# Patient Record
Sex: FEMALE | Race: OTHER
[De-identification: names, ages, dates, MRNs, and addresses within clinical notes are randomized per-mention and may not be internally consistent; named-entity substitution may affect disease eponyms.]

---

## 2018-07-17 ENCOUNTER — HOSPITAL ENCOUNTER (OUTPATIENT)
Dept: HOSPITAL 62 - WI | Age: 53
End: 2018-07-17
Attending: INTERNAL MEDICINE
Payer: COMMERCIAL

## 2018-07-17 DIAGNOSIS — Z12.31: Primary | ICD-10-CM

## 2018-07-17 PROCEDURE — 77063 BREAST TOMOSYNTHESIS BI: CPT

## 2018-07-17 PROCEDURE — 77067 SCR MAMMO BI INCL CAD: CPT

## 2018-07-18 NOTE — WOMENS IMAGING REPORT
EXAM DESCRIPTION:  3D SCREENING MAMMO BILAT



COMPLETED DATE/TIME:  7/17/2018 11:03 am



REASON FOR STUDY:  SCREENING MAMMO Z12.31  ENCNTR SCREEN MAMMOGRAM FOR MALIGNANT NEOPLASM OF ANY



COMPARISON:  2/6/2014



TECHNIQUE:  Standard craniocaudal and mediolateral oblique views of each breast recorded using digita
l acquisition and breast tomosynthesis.



LIMITATIONS:  None.



FINDINGS:  No masses, calcifications or architectural distortion. No areas of suspicion.

Read with the assistance of CAD.

.[facility] - R2 Cenova Version 1.3

.[facility] - R2 Cenova Version 1.3

.[facility] - R2 Cenova Version 2.4

.[facility] [place] - R2 Cenova Version 2.4

.[facility] - R2  Version 9.2



IMPRESSION:  NORMAL MAMMOGRAM.  BIRADS 1.



BREAST DENSITY:  a. The breasts are almost entirely fatty.



BIRAD:  1 NEGATIVE



RECOMMENDATION:  ROUTINE SCREENING

Please continue yearly bilateral screening mammography/tomosynthesis in July 2019.



COMMENT:  The patient has been notified of the results by letter per SA requirements. Additional no
tification policies are in place for contacting patient with suspicious or incomplete findings.

Quality ID #[id number]: The American College of Radiology recommends an annual screening mammogram for women
 aged 40 years or over. This facility utilizes a reminder system to ensure that all patients receive 
reminder letters, and/or direct phone calls for appointments. This includes reminders for routine scr
eening mammograms, diagnostic mammograms, or other Breast Imaging Interventions when appropriate.  Th
is patient will be placed in the appropriate reminder system.

The American College of Radiology (ACR) has developed recommendations for screening MRI of the breast
s in certain patient populations, to be used in conjunction with mammography.  Breast MRI surveillanc
e may be appropriate for women with more than 20% lifetime risk of developing breast cancer  as deter
mined by genetic testing, significant family history of the disease, or history of mantle radiation f
or Hodgkins Disease.  ACR Practice Guidelines 2008.

[employer]



PQRS 6045F: Fluoroscopic imaging is not utilized for breast tomosynthesis.



TECHNICAL DOCUMENTATION:  FINDING NUMBER: (1)

ASSESSMENT:  (1)

JOB ID:  [id number]

 2011 [employer]- All Rights Reserved



Reading location - IP/workstation name: [facility]-RR2

## 2018-07-20 ENCOUNTER — HOSPITAL ENCOUNTER (INPATIENT)
Dept: HOSPITAL 62 - ER | Age: 53
LOS: 4 days | Discharge: HOME | DRG: 638 | End: 2018-07-24
Attending: INTERNAL MEDICINE | Admitting: INTERNAL MEDICINE
Payer: COMMERCIAL

## 2018-07-20 DIAGNOSIS — I11.0: ICD-10-CM

## 2018-07-20 DIAGNOSIS — Z87.891: ICD-10-CM

## 2018-07-20 DIAGNOSIS — E66.01: ICD-10-CM

## 2018-07-20 DIAGNOSIS — Z88.6: ICD-10-CM

## 2018-07-20 DIAGNOSIS — E78.5: ICD-10-CM

## 2018-07-20 DIAGNOSIS — D64.9: ICD-10-CM

## 2018-07-20 DIAGNOSIS — E11.00: Primary | ICD-10-CM

## 2018-07-20 DIAGNOSIS — Z79.84: ICD-10-CM

## 2018-07-20 DIAGNOSIS — I50.9: ICD-10-CM

## 2018-07-20 DIAGNOSIS — Z90.710: ICD-10-CM

## 2018-07-20 LAB
ADD MANUAL DIFF: NO
ALBUMIN SERPL-MCNC: 4.9 G/DL (ref 3.5–5)
ALP SERPL-CCNC: 131 U/L (ref 38–126)
ALT SERPL-CCNC: 36 U/L (ref 9–52)
ANION GAP SERPL CALC-SCNC: 22 MMOL/L (ref 5–19)
APPEARANCE UR: CLEAR
APTT PPP: (no result) S
AST SERPL-CCNC: 25 U/L (ref 14–36)
BASE EXCESS BLDV CALC-SCNC: -5 MMOL/L
BASOPHILS # BLD AUTO: 0.1 10^3/UL (ref 0–0.2)
BASOPHILS NFR BLD AUTO: 1.1 % (ref 0–2)
BILIRUB DIRECT SERPL-MCNC: 0.5 MG/DL (ref 0–0.4)
BILIRUB SERPL-MCNC: 0.7 MG/DL (ref 0.2–1.3)
BILIRUB UR QL STRIP: NEGATIVE
BUN SERPL-MCNC: 22 MG/DL (ref 7–20)
CALCIUM: 9.9 MG/DL (ref 8.4–10.2)
CHLORIDE SERPL-SCNC: 94 MMOL/L (ref 98–107)
CO2 SERPL-SCNC: 22 MMOL/L (ref 22–30)
EOSINOPHIL # BLD AUTO: 0.1 10^3/UL (ref 0–0.6)
EOSINOPHIL NFR BLD AUTO: 0.6 % (ref 0–6)
ERYTHROCYTE [DISTWIDTH] IN BLOOD BY AUTOMATED COUNT: 16.6 % (ref 11.5–14)
GLUCOSE SERPL-MCNC: 647 MG/DL (ref 75–110)
GLUCOSE UR STRIP-MCNC: >=500 MG/DL
HCO3 BLDV-SCNC: 20.1 MMOL/L (ref 20–32)
HCT VFR BLD CALC: 44.7 % (ref 36–47)
HGB BLD-MCNC: 14.5 G/DL (ref 12–15.5)
KETONES UR STRIP-MCNC: 20 MG/DL
LYMPHOCYTES # BLD AUTO: 3.8 10^3/UL (ref 0.5–4.7)
LYMPHOCYTES NFR BLD AUTO: 35 % (ref 13–45)
MCH RBC QN AUTO: 26 PG (ref 27–33.4)
MCHC RBC AUTO-ENTMCNC: 32.5 G/DL (ref 32–36)
MCV RBC AUTO: 80 FL (ref 80–97)
MONOCYTES # BLD AUTO: 0.7 10^3/UL (ref 0.1–1.4)
MONOCYTES NFR BLD AUTO: 6.8 % (ref 3–13)
NEUTROPHILS # BLD AUTO: 6.2 10^3/UL (ref 1.7–8.2)
NEUTS SEG NFR BLD AUTO: 56.5 % (ref 42–78)
NITRITE UR QL STRIP: NEGATIVE
PCO2 BLDV: 37.7 MMHG (ref 35–63)
PH BLDV: 7.34 [PH] (ref 7.3–7.42)
PH UR STRIP: 5 [PH] (ref 5–9)
PLATELET # BLD: 397 10^3/UL (ref 150–450)
POTASSIUM SERPL-SCNC: 5.2 MMOL/L (ref 3.6–5)
PROT SERPL-MCNC: 8.6 G/DL (ref 6.3–8.2)
PROT UR STRIP-MCNC: NEGATIVE MG/DL
RBC # BLD AUTO: 5.58 10^6/UL (ref 3.72–5.28)
SODIUM SERPL-SCNC: 137.8 MMOL/L (ref 137–145)
SP GR UR STRIP: 1.03
TOTAL CELLS COUNTED % (AUTO): 100 %
UROBILINOGEN UR-MCNC: NEGATIVE MG/DL (ref ?–2)
WBC # BLD AUTO: 11 10^3/UL (ref 4–10.5)

## 2018-07-20 PROCEDURE — 94660 CPAP INITIATION&MGMT: CPT

## 2018-07-20 PROCEDURE — 83036 HEMOGLOBIN GLYCOSYLATED A1C: CPT

## 2018-07-20 PROCEDURE — 96375 TX/PRO/DX INJ NEW DRUG ADDON: CPT

## 2018-07-20 PROCEDURE — 85025 COMPLETE CBC W/AUTO DIFF WBC: CPT

## 2018-07-20 PROCEDURE — 80053 COMPREHEN METABOLIC PANEL: CPT

## 2018-07-20 PROCEDURE — 36415 COLL VENOUS BLD VENIPUNCTURE: CPT

## 2018-07-20 PROCEDURE — 80061 LIPID PANEL: CPT

## 2018-07-20 PROCEDURE — 82803 BLOOD GASES ANY COMBINATION: CPT

## 2018-07-20 PROCEDURE — 96374 THER/PROPH/DIAG INJ IV PUSH: CPT

## 2018-07-20 PROCEDURE — 81001 URINALYSIS AUTO W/SCOPE: CPT

## 2018-07-20 PROCEDURE — 82962 GLUCOSE BLOOD TEST: CPT

## 2018-07-20 PROCEDURE — 99285 EMERGENCY DEPT VISIT HI MDM: CPT

## 2018-07-20 PROCEDURE — G0378 HOSPITAL OBSERVATION PER HR: HCPCS

## 2018-07-20 PROCEDURE — 96361 HYDRATE IV INFUSION ADD-ON: CPT

## 2018-07-20 NOTE — ER DOCUMENT REPORT
ED General





- General


Chief Complaint: High Blood Sugar


Stated Complaint: HIGH BLOOD SUGAR


Time Seen by Provider: 07/20/18 19:13


TRAVEL OUTSIDE OF THE [place] IN LAST 30 DAYS: No





- HPI


Notes: 





53-year-old female presents from urgent care with elevated blood sugar.  She 

has had intermittent blurry vision and increased urination over the past few 

days.  Blurry vision worsened today while looking at the computer.  She went to 

urgent care and was told that her blood sugar was elevated.  It was advised to 

come to the emergency department.  Denies any nausea, vomiting, abdominal pain, 

diarrhea.  Denies family history of diabetes.  No fevers chills.  No pain with 

urination.





- Related Data


Allergies/Adverse Reactions: 


 





codeine [Codeine] Allergy (Mild, Verified 06/13/14 07:05)


 crazy











Past Medical History





- Social History


Smoking Status: Never Smoker


Chew tobacco use (# tins/day): No


Frequency of alcohol use: None


Drug Abuse: None


Family History: Reviewed & Not Pertinent


Patient has suicidal ideation: No


Patient has homicidal ideation: No





- Past Medical History


Cardiac Medical History: Reports: Hx Coronary Artery Disease, Hx Hypertension


   Denies: Hx Heart Attack


Pulmonary Medical History: 


   Denies: Hx Asthma, Hx Bronchitis, Hx COPD, Hx Pneumonia


Neurological Medical History: Denies: Hx Cerebrovascular Accident, Hx Seizures


Renal/ Medical History: Denies: Hx Peritoneal Dialysis


Musculoskeletal Medical History: Denies Hx Arthritis


Past Surgical History: Reports: Hx Hysterectomy





- Immunizations


Hx Diphtheria, Pertussis, Tetanus Vaccination: Yes





Review of Systems





- Review of Systems


Notes: 





REVIEW OF SYSTEMS:


CONSTITUTIONAL: -fevers, -chills


EENT: -eye pain, -difficulty swallowing, -nasal congestion, + blurry vision


CARDIOVASCULAR: -chest pain, -syncope.


RESPIRATORY: -cough, -SOB


GASTROINTESTINAL: -abdominal pain, -nausea, -vomiting, -diarrhea


GENITOURINARY: -dysuria, -hematuria, + increased urination


MUSCULOSKELETAL: -back pain, -neck pain


SKIN: -rash or skin lesions.


HEMATOLOGIC: -easy bruising or bleeding.


LYMPHATIC: -swollen, enlarged glands.


NEUROLOGICAL: -altered mental status or loss of consciousness, -headache, -

neurologic symptoms


PSYCHIATRIC: -anxiety, -depression.








Physical Exam





- Vital signs


Vitals: 


 











Temp Pulse Resp BP Pulse Ox


 


 97.9 F   90   20   96/67 L  100 


 


 07/20/18 18:40  07/20/18 18:40  07/20/18 18:40  07/20/18 18:40  07/20/18 18:40














- Notes


Notes: 





PHYSICAL EXAMINATION:


GENERAL: Well-appearing, well-nourished and in no acute distress.  Morbid 

obesity


HEAD: Atraumatic, normocephalic.


EYES: Pupils equal round and reactive to light, extraocular movements intact, 

conjunctiva are normal.


ENT: nares patent, oropharynx clear without exudates.  Moist mucous membranes.


NECK: Normal range of motion, supple without lymphadenopathy


LUNGS: Breath sounds clear to auscultation bilaterally and equal.  No wheezes 

rales or rhonchi.


HEART: Regular rate and rhythm, no chest wall tenderness 


ABDOMEN: Soft, nontender, normoactive bowel sounds.  No guarding, no rebound.  

No masses appreciated.


EXTREMITIES: Normal range of motion, no pitting or edema.  No cyanosis.


NEUROLOGICAL: Cranial nerves grossly intact.  Normal speech, normal gait.  

Normal sensory and motor exams.


PSYCH: Normal mood, normal affect.


SKIN: Warm, Dry, normal turgor, no rashes or lesions noted.





Course





- Re-evaluation


Re-evalutation: 





07/20/18 22:04


Blood sugar elevated without signs of diabetic ketoacidosis.  We will start 

metformin and give a dose of IV insulin and recheck blood sugar.  Asymptomatic.


07/20/18 23:55


Patient developed severe muscle cramps and spasms shortly after receiving 

metformin and insulin.  She has carpal spasm with reddening of her palms.  She 

denies similar symptoms in the past.  She was prescribed metformin at urgent 

care today, but did not take a dose. she was given 15 units of SC humulin at 

urgent care.  She is feeling slightly more comfortable after Ativan and 

Toradol.  Discussed with hospitalist for possible admission, and he recommended 

discharge with prescription for Amaryl.  Will monitor for 1 more hour and 

recheck blood sugar again prior to discharge. will discuss with DR Uriarte at 

shift change for further monitoring.








- Vital Signs


Vital signs: 


 











Temp Pulse Resp BP Pulse Ox


 


 97.9 F   90   22 H  141/102 H  100 


 


 07/20/18 18:40  07/20/18 18:40  07/20/18 20:57  07/20/18 20:57  07/20/18 20:57














- Laboratory


Result Diagrams: 


 07/20/18 19:33





 07/20/18 19:33


Laboratory results interpreted by me: 


 











  07/20/18 07/20/18 07/20/18





  19:33 19:33 19:33


 


WBC  11.0 H  


 


RBC  5.58 H  


 


MCH  26.0 L  


 


RDW  16.6 H  


 


Potassium   5.2 H 


 


Chloride   94 L 


 


Anion Gap   22 H 


 


BUN   22 H 


 


Glucose   647 H* 


 


Direct Bilirubin   0.5 H 


 


Alkaline Phosphatase   131 H 


 


Total Protein   8.6 H 


 


Urine Glucose (UA)    >=500 H


 


Urine Ketones    20 H


 


Urine Blood    SMALL H














- Transfer of Care


Care transferred to following provider: Kalani





Discharge





- Discharge


Clinical Impression: 


 Hyperglycemia





Condition: Good


Disposition: HOME, SELF-CARE


Instructions:  Diabetes ([facility])


Additional Instructions: 


Decrease carbohydrates in diet.  Monitor blood sugar daily and keep a log.  You 

must follow-up closely with a primary care physician for further management.  

Return for worsening or concerning symptoms.








HYPERGLYCEMIA (HIGH BLOOD SUGAR):





     You have an abnormally high blood sugar. Not all high blood sugar requires 

long-term treatment.  High blood sugar can be due to medications, pregnancy, or 

the stress of illness.  (These cases are "borderline diabetes.")  If the doctor 

feels your high blood sugar might resolve with time, you may not require 

treatment now.


     It's very important that you follow through, to see if the blood sugar 

returns to normal levels.  Uncontrolled high blood sugar leads to early heart 

disease, strokes, nerve damage, eye damage, and kidney damage.


     Call the physician if there is faintness, excess sleepiness, or very rapid 

breathing.








DIABETES:





     You have an abnormally high blood sugar, suspicious for diabetes. Not all 

high blood sugar requires long-term treatment.  High blood sugar can be due to 

medications, pregnancy, or the stress of illness. (These cases are "borderline 

diabetes.")  If the doctor feels your high blood sugar might get better with 

time, you may not require treatment now.


     It's very important that you follow through.  Uncontrolled high blood 

sugar leads to early heart disease, strokes, nerve damage, eye damage, and 

kidney damage.


     All diabetics should follow a diet designed to control the blood sugar.  

Overweight diabetics should exercise regularly and lose weight. If this is not 

sufficient to control the blood sugar, pills or insulin


shots are necessary.  Younger people who develop diabetes almost always require 

insulin daily.


     Home testing of blood sugars or urine sugar is required. Diabetic teaching 

is available to help you figure insulin doses and monitor the blood sugar.


     Call the physician if there is faintness, excess sleepiness, or very rapid 

breathing.  If hypoglycemia (LOW blood sugar) develops, symptoms are shakiness, 

weakness, sweating, and confusion.  In this case, you should eat or drink 

something with sugar at once.








INSULIN:


     Insulin is a natural hormone that lowers blood sugar.  Normal blood sugar 

prevents complications of diabetes.  For most diabetics, insulin is the best 

way to treat the illness.


     Be sure you know how to measure the insulin correctly.  Insulin is 

measured in "units."  There are three types of insulin: N (NPH or long acting), 

R (regular or short acting), and L (Lente or very long acting).  Be sure you 

are using the right amount of each type.


     Insulin must be injected into the fat.  You can use the abdomen, upper arms

, and thighs.  Select a different injection site every time. Wipe the site with 

alcohol before injecting.


     When first starting insulin, some adjusting of the insulin dose is 

necessary.  Keep a record of each insulin dose and time of injection, and of 

the blood sugar and the time you test it.


     Sometimes insulin can make the blood sugar too low.  If you become dizzy, 

sweaty, shaky, or confused, you may be having a hypoglycemic episode.  

Immediately use juice or some other sweet food.  Call the doctor if the 

symptoms don't go away.








ORAL HYPOGLYCEMIC MEDICATION:


     Oral hypoglycemics are medicines that lower blood sugar in diabetics.  

They are not effective for younger diabetics who require insulin.  Some brands 

are tolbutamide, Orinase, glipizide, Glucotrol, glyburide, DiaBeta, Glynase, 

and Micronase.


     Some medications can increase or decrease the effect of Diabinese. 

Examples are Clofibrate (Atromid-S), phenylbutazone (Butazolidin), aspirin, 

sulfonamides, Coumadin, allopurinol (Zyloprim), probenecid (Benemid), 

acetazolamide (Diamox), beta blockers, steroids, estrogens, Indocin, INH, 

Levothyroxine, nicotinic acid, Diflucan, Dilantin, and thiazide diuretics.  Be 

sure your doctor knows all the medicines you take, and talk to your doctor 

before making any changes in your medicines.


     If you develop symptoms of shakiness, sweats, and lightheadedness, your 

blood sugar may have gone too low.  Eat or drink a small amount of sweet food.  

If symptoms don't go away, call your doctor.








FOLLOW-UP CARE:


If you have been referred to a physician for follow-up care, call the physician

s office for an appointment as you were instructed or within the next two days.

  If you experience worsening or a significant change in your symptoms, notify 

the physician immediately or return to the Emergency Department at any time for 

re-evaluation.











Referrals: 


TYRONE HAHN MD [Primary Care Provider] - Follow up in 3-5 days

## 2018-07-20 NOTE — ER DOCUMENT REPORT
ED Medical Screen (RME)





- General


Chief Complaint: High Blood Sugar


Stated Complaint: HIGH BLOOD SUGAR


Time Seen by Provider: 07/20/18 19:13


TRAVEL OUTSIDE OF THE [place] IN LAST 30 DAYS: No





- HPI


Patient complains to provider of: Hyperglycemia


Notes: 





07/20/18 19:17


Patient is a 53-year-old female presenting to the emergency room complaining of 

increased thirst and increased urination over the past few days, took a blood 

sugar at home and it read high, reading high in triage area as well, no history 

of diabetes PRIOR to day





- Related Data


Allergies/Adverse Reactions: 


 





codeine [Codeine] Allergy (Mild, Verified 06/13/14 07:05)


 crazy











Past Medical History





- Past Medical History


Cardiac Medical History: Reports: Hx Coronary Artery Disease, Hx Hypertension


   Denies: Hx Heart Attack


Pulmonary Medical History: 


   Denies: Hx Asthma, Hx Bronchitis, Hx COPD, Hx Pneumonia


Neurological Medical History: Denies: Hx Cerebrovascular Accident, Hx Seizures


Musculoskeltal Medical History: Denies Hx Arthritis


Past Surgical History: Reports: Hx Hysterectomy





- Immunizations


Hx Diphtheria, Pertussis, Tetanus Vaccination: Yes





Physical Exam





- Vital signs


Vitals: 





 











Temp Pulse Resp BP Pulse Ox


 


 97.9 F   90   20   96/67 L  100 


 


 07/20/18 18:40  07/20/18 18:40  07/20/18 18:40  07/20/18 18:40  07/20/18 18:40














Course





- Vital Signs


Vital signs: 





 











Temp Pulse Resp BP Pulse Ox


 


 97.9 F   90   20   96/67 L  100 


 


 07/20/18 18:40  07/20/18 18:40  07/20/18 18:40  07/20/18 18:40  07/20/18 18:40














Doctor's Discharge





- Discharge


Referrals: 


TYRONE HAHN MD [Primary Care Provider] - Follow up as needed

## 2018-07-21 LAB
ALBUMIN SERPL-MCNC: 4 G/DL (ref 3.5–5)
ALP SERPL-CCNC: 102 U/L (ref 38–126)
ALT SERPL-CCNC: 35 U/L (ref 9–52)
ANION GAP SERPL CALC-SCNC: 24 MMOL/L (ref 5–19)
AST SERPL-CCNC: 17 U/L (ref 14–36)
BILIRUB DIRECT SERPL-MCNC: 0.4 MG/DL (ref 0–0.4)
BILIRUB SERPL-MCNC: 0.6 MG/DL (ref 0.2–1.3)
BUN SERPL-MCNC: 22 MG/DL (ref 7–20)
CALCIUM: 9.3 MG/DL (ref 8.4–10.2)
CHLORIDE SERPL-SCNC: 98 MMOL/L (ref 98–107)
CHOLEST SERPL-MCNC: 223.19 MG/DL (ref 0–200)
CO2 SERPL-SCNC: 17 MMOL/L (ref 22–30)
GLUCOSE SERPL-MCNC: 498 MG/DL (ref 75–110)
LDLC SERPL DIRECT ASSAY-MCNC: 170 MG/DL (ref ?–100)
POTASSIUM SERPL-SCNC: 4.9 MMOL/L (ref 3.6–5)
PROT SERPL-MCNC: 7.1 G/DL (ref 6.3–8.2)
SODIUM SERPL-SCNC: 139.1 MMOL/L (ref 137–145)
TRIGL SERPL-MCNC: 153 MG/DL (ref ?–150)
VLDLC SERPL CALC-MCNC: 30.6 MG/DL (ref 10–31)

## 2018-07-21 RX ADMIN — AMLODIPINE BESYLATE SCH MG: 10 TABLET ORAL at 09:28

## 2018-07-21 RX ADMIN — INSULIN LISPRO PRN UNIT: 100 INJECTION, SOLUTION INTRAVENOUS; SUBCUTANEOUS at 22:26

## 2018-07-21 RX ADMIN — HYDROCHLOROTHIAZIDE SCH MG: 25 TABLET ORAL at 09:28

## 2018-07-21 RX ADMIN — GLIPIZIDE SCH MG: 5 TABLET ORAL at 17:08

## 2018-07-21 RX ADMIN — ATORVASTATIN CALCIUM SCH MG: 20 TABLET, FILM COATED ORAL at 22:26

## 2018-07-21 RX ADMIN — INSULIN LISPRO PRN UNIT: 100 INJECTION, SOLUTION INTRAVENOUS; SUBCUTANEOUS at 17:03

## 2018-07-21 RX ADMIN — INSULIN LISPRO PRN UNIT: 100 INJECTION, SOLUTION INTRAVENOUS; SUBCUTANEOUS at 08:32

## 2018-07-21 RX ADMIN — VALSARTAN SCH MG: 160 TABLET ORAL at 09:28

## 2018-07-21 RX ADMIN — INSULIN LISPRO PRN UNIT: 100 INJECTION, SOLUTION INTRAVENOUS; SUBCUTANEOUS at 13:42

## 2018-07-21 RX ADMIN — INSULIN LISPRO PRN UNIT: 100 INJECTION, SOLUTION INTRAVENOUS; SUBCUTANEOUS at 11:23

## 2018-07-21 RX ADMIN — METFORMIN HYDROCHLORIDE SCH MG: 500 TABLET, FILM COATED ORAL at 17:02

## 2018-07-21 RX ADMIN — SODIUM CHLORIDE PRN ML: 9 INJECTION, SOLUTION INTRAVENOUS at 14:32

## 2018-07-21 NOTE — PDOC H&P
History of Present Illness


Admission Date/PCP: 


  07/21/18 00:26





  TYRONE HAHN MD





Patient complains of: Elevated  blood sugar


History of Present Illness: 


CONNIE MURILLO is a 53 year old female patient of Dr Hahn who presented to 

the ED with complain of elevated blood glucose. She reported history of 

diagnosis of borderline diabetes mellitus couple of years ago. Patient reported 

recent intermittent blurry vision, increase thirst, polydipsia, polyuria, dry 

mouth, and tingling in her fingers. She has history of hypertension. She denied 

history of hyperlipidemia. No family history of diabetes mellitus. Patient 

admitted to less physical activity due to longstanding lower back pain. She 

reported recent episode of oral thrush. She denied any fever or chills. No 

chest pain or difficulty with breathing. Her evaluation in the ED was 

significant for hyperglycemia. in the course of her treatment she was given 

Insulin bolus and oral metformin which cause severe abdominal pain. She was 

given Metformin on empty stomach. Her last meal prior to administration was 1 

pm.





Past Medical History


Cardiac Medical History: Reports: Coronary Artery Disease, Hypertension


   Denies: Congestive Heart Failure, Myocardial Infarction


Pulmonary Medical History: 


   Denies: Asthma, Bronchitis, Chronic Obstructive Pulmonary Disease (COPD), 

Pneumonia


Neurological Medical History: 


   Denies: Seizures


Musculoskeltal Medical History: 


   Denies: Arthritis


Hematology: Reports: Anemia





Past Surgical History


Past Surgical History: Reports: Hysterectomy





Social History


Smoking Status: Former Smoker


Drugs: None





- Advance Directive


Resuscitation Status: Full Code





Family History


Family History: Reviewed & Not Pertinent


Parental Family History Reviewed: Yes


Children Family History Reviewed: Yes


Sibling(s) Family History Reviewed.: Yes





Medication/Allergy


Home Medications: 








Amlodipine/Valsartan/Hcthiazid [Amlod-Valsa-Hctz -25 mg] 1 tab PO DAILY 07 /21/18 


Clindamycin HCl [Clindamycin HCl] 300 mg PO Q8 MDD FILLED 7/13 FOR 10DS 07/21/ 18 


Glipizide [Glocotrol 5 Mg Tablet] 5 mg PO BID 07/21/18 


Metformin HCl [Glucophage 500 mg Tablet] 1,000 mg PO BIDACBS 07/21/18 


Nystatin 4 tsp PO QID 07/21/18 








Allergies/Adverse Reactions: 


 





codeine [Codeine] Allergy (Mild, Verified 06/13/14 07:05)


 crazy











Review of Systems


Constitutional: ABSENT: chills, fever(s), headache(s), weight gain, weight loss


Eyes: PRESENT: visual disturbances - improving as per her assessment


Ears: ABSENT: hearing changes


Nose, Mouth, and Throat: ABSENT: as per HPI, headache(s), mouth pain, sore 

throat, vertigo, other


Cardiovascular: ABSENT: chest pain, dyspnea on exertion, edema, orthropnea, 

palpitations


Respiratory: ABSENT: cough, hemoptysis


Gastrointestinal: PRESENT: abdominal pain - with Metformin administration on 

empty stomach.  ABSENT: constipation, diarrhea, hematemesis, hematochezia, 

nausea, vomiting


Musculoskeletal: PRESENT: back pain - chronic


Integumentary: ABSENT: rash, wounds


Neurological: ABSENT: abnormal gait, abnormal speech, confusion, dizziness, 

focal weakness, syncope


Psychiatric: ABSENT: anxiety, depression, homidical ideation, suicidal ideation


Endocrine: PRESENT: polydipsia, polyuria.  ABSENT: as per HPI, cold intolerance

, flushing, heat intolerance, menstrual abnormalities, polyphagia, other


Hematologic/Lymphatic: ABSENT: easy bleeding, easy bruising, lymphadenopathy


Allergic/Immunologic: ABSENT: seasonal rhinorrhea





Physical Exam


Vital Signs: 


 











Temp Pulse Resp BP Pulse Ox


 


 98.6 F   91   18   145/77 H  100 


 


 07/21/18 08:35  07/21/18 08:35  07/21/18 08:35  07/21/18 08:35  07/21/18 08:35








 Intake & Output











 07/20/18 07/21/18 07/22/18





 06:59 06:59 06:59


 


Intake Total   465


 


Balance   465


 


Weight  168.8 kg 











General appearance: PRESENT: morbidly obese


Head exam: PRESENT: atraumatic, normocephalic


Eye exam: PRESENT: conjunctiva pink, EOMI, PERRLA.  ABSENT: scleral icterus


Ear exam: PRESENT: normal external ear exam


Mouth exam: PRESENT: moist, tongue midline


Teeth exam: PRESENT: poor dentation.  ABSENT: dental caries, dental tenderness, 

edentulous, other


Throat exam: ABSENT: post pharyngeal erythema, tonsillar erythema, tonsillar 

exudate, tonsillogmegaly, other


Neck exam: PRESENT: full ROM.  ABSENT: carotid bruit, JVD, lymphadenopathy, 

thyromegaly


Respiratory exam: PRESENT: clear to auscultation barbara


Cardiovascular exam: PRESENT: RRR.  ABSENT: diastolic murmur, rubs, systolic 

murmur


Pulses: PRESENT: normal dorsalis pedis pul, +2 pedal pulses bilateral


Vascular exam: PRESENT: normal capillary refill.  ABSENT: pallor


GI/Abdominal exam: PRESENT: normal bowel sounds, soft.  ABSENT: distended, 

guarding, mass, organolmegaly, rebound, tenderness


Rectal exam: PRESENT: deferred


Extremities exam: ABSENT: pedal edema


Musculoskeletal exam: PRESENT: normal inspection


Neurological exam: PRESENT: alert, awake, oriented to person, oriented to place

, oriented to time, oriented to situation, CN II-XII grossly intact.  ABSENT: 

motor sensory deficit


Psychiatric exam: PRESENT: appropriate affect, normal mood.  ABSENT: homicidal 

ideation, suicidal ideation


Skin exam: PRESENT: dry, intact, warm.  ABSENT: cyanosis, rash





Results


Laboratory Results: 


 





 07/21/18 09:44 





 











  07/21/18 07/21/18





  06:46 09:44


 


Sodium  Cancelled  139.1


 


Potassium  Cancelled  4.9


 


Chloride  Cancelled  98


 


Carbon Dioxide  Cancelled  17 L


 


Anion Gap  Cancelled  24 H


 


BUN  Cancelled  22 H


 


Creatinine  Cancelled  0.89


 


Est GFR ( Amer)  Cancelled  > 60


 


Est GFR (Non-Af Amer)  Cancelled  > 60


 


Glucose  Cancelled  498 H*


 


Calcium  Cancelled  9.3


 


Total Bilirubin  Cancelled  0.6


 


AST  Cancelled  17


 


ALT  Cancelled  35


 


Alkaline Phosphatase  Cancelled  102


 


Total Protein  Cancelled  7.1


 


Albumin  Cancelled  4.0


 


Triglycerides  Cancelled  153 H


 


Cholesterol  Cancelled  223.19 H


 


LDL Cholesterol Direct  Cancelled  170 H


 


VLDL Cholesterol  Cancelled  30.6


 


HDL Cholesterol  Cancelled  43














Assessment & Plan





- Diagnosis


(1) Diabetes mellitus type 2, uncontrolled


Qualifiers: 


   Diabetes mellitus long term insulin use: without long term use   Diabetes 

mellitus complication status: with hyperglycemia   Qualified Code(s): E11.65 - 

Type 2 diabetes mellitus with hyperglycemia   


Is this a current diagnosis for this admission?: Yes   


Plan: 


See admitting attending physician orders








(2) Morbid obesity with BMI of 60.0-69.9, adult


Is this a current diagnosis for this admission?: Yes   


Plan: 


See admitting attending physician orders








(3) HTN (hypertension)


Qualifiers: 


   Hypertension type: essential hypertension   Qualified Code(s): I10 - 

Essential (primary) hypertension   


Is this a current diagnosis for this admission?: Yes   


Plan: 


See admitting attending physician orders








(4) HLD (hyperlipidemia)


Qualifiers: 


   Hyperlipidemia type: pure hypercholesterolemia   Qualified Code(s): E78.00 - 

Pure hypercholesterolemia, unspecified; E78.0 - Pure hypercholesterolemia   


Is this a current diagnosis for this admission?: Yes   


Plan: 


See admitting attending physician orders








- Time


Time Spent: 50 to 70 Minutes


Medications reviewed and adjusted accordingly: Yes


Anticipated discharge: Home


Within: Other





- Inpatient Certification


Based on my medical assessment, after consideration of the patient's 

comorbidities, presenting symptoms, or acuity I expect that the services needed 

warrant INPATIENT care.: Yes


I certify that my determination is in accordance with my understanding of 

Medicare's requirements for reasonable and necessary INPATIENT services [42 CFR 

412.3e].: Yes


Medical Necessity: Need Close Monitoring Due to Risk of Patient Decompensation, 

Need For IV Fluids, Risk of Complication if Not Cared For in Hospital


Post Hospital Care: D/C Planner Documentation





- Plan Summary


Plan Summary: 





See admitting attending physician orders

## 2018-07-22 LAB
ALBUMIN SERPL-MCNC: 3.4 G/DL (ref 3.5–5)
ALP SERPL-CCNC: 89 U/L (ref 38–126)
ALT SERPL-CCNC: 32 U/L (ref 9–52)
ANION GAP SERPL CALC-SCNC: 14 MMOL/L (ref 5–19)
AST SERPL-CCNC: 17 U/L (ref 14–36)
BILIRUB DIRECT SERPL-MCNC: 0.4 MG/DL (ref 0–0.4)
BILIRUB SERPL-MCNC: 0.5 MG/DL (ref 0.2–1.3)
BUN SERPL-MCNC: 22 MG/DL (ref 7–20)
CALCIUM: 9.3 MG/DL (ref 8.4–10.2)
CHLORIDE SERPL-SCNC: 102 MMOL/L (ref 98–107)
CO2 SERPL-SCNC: 21 MMOL/L (ref 22–30)
GLUCOSE SERPL-MCNC: 275 MG/DL (ref 75–110)
POTASSIUM SERPL-SCNC: 4.4 MMOL/L (ref 3.6–5)
PROT SERPL-MCNC: 6.3 G/DL (ref 6.3–8.2)
SODIUM SERPL-SCNC: 137.3 MMOL/L (ref 137–145)

## 2018-07-22 RX ADMIN — GLIPIZIDE SCH MG: 5 TABLET ORAL at 09:28

## 2018-07-22 RX ADMIN — GLIPIZIDE SCH MG: 5 TABLET ORAL at 16:46

## 2018-07-22 RX ADMIN — METFORMIN HYDROCHLORIDE SCH MG: 500 TABLET, FILM COATED ORAL at 07:45

## 2018-07-22 RX ADMIN — METFORMIN HYDROCHLORIDE SCH MG: 500 TABLET, FILM COATED ORAL at 16:46

## 2018-07-22 RX ADMIN — HYDROCHLOROTHIAZIDE SCH MG: 25 TABLET ORAL at 09:28

## 2018-07-22 RX ADMIN — AMLODIPINE BESYLATE SCH MG: 10 TABLET ORAL at 09:28

## 2018-07-22 RX ADMIN — INSULIN LISPRO PRN UNIT: 100 INJECTION, SOLUTION INTRAVENOUS; SUBCUTANEOUS at 12:16

## 2018-07-22 RX ADMIN — ATORVASTATIN CALCIUM SCH MG: 20 TABLET, FILM COATED ORAL at 22:13

## 2018-07-22 RX ADMIN — INSULIN LISPRO PRN UNIT: 100 INJECTION, SOLUTION INTRAVENOUS; SUBCUTANEOUS at 07:46

## 2018-07-22 RX ADMIN — SODIUM CHLORIDE PRN ML: 9 INJECTION, SOLUTION INTRAVENOUS at 05:02

## 2018-07-22 RX ADMIN — INSULIN LISPRO PRN UNIT: 100 INJECTION, SOLUTION INTRAVENOUS; SUBCUTANEOUS at 22:13

## 2018-07-22 RX ADMIN — VALSARTAN SCH MG: 160 TABLET ORAL at 09:28

## 2018-07-22 RX ADMIN — INSULIN LISPRO PRN UNIT: 100 INJECTION, SOLUTION INTRAVENOUS; SUBCUTANEOUS at 16:46

## 2018-07-22 NOTE — PDOC PROGRESS REPORT
Subjective


Progress Note for:: 07/22/18


Subjective:: 





Patient is more compliant with medication administration. Hyperglycemia is 

improving comparatively. Remain on IV fluid support. No chest pain or 

difficulty with breathing.


Reason For Visit: 


DIABETES MELLITUS TYPE 2 UNCONTROLLED, HTN, HLD








Physical Exam


Vital Signs: 


 











Temp Pulse Resp BP Pulse Ox


 


 97.7 F   96   18   114/79   100 


 


 07/22/18 12:00  07/22/18 14:00  07/22/18 12:00  07/22/18 12:00  07/22/18 12:00








 Intake & Output











 07/21/18 07/22/18 07/23/18





 06:59 06:59 06:59


 


Intake Total  2641 


 


Balance  2641 


 


Weight  169.8 kg 











General appearance: PRESENT: no acute distress, morbidly obese


Head exam: PRESENT: atraumatic, normocephalic


Eye exam: PRESENT: conjunctiva pink, EOMI, PERRLA.  ABSENT: scleral icterus


Mouth exam: PRESENT: moist, neck supple, tongue midline


Respiratory exam: PRESENT: clear to auscultation barbara


Cardiovascular exam: PRESENT: RRR.  ABSENT: diastolic murmur, rubs, systolic 

murmur


Vascular exam: PRESENT: normal capillary refill.  ABSENT: pallor


GI/Abdominal exam: PRESENT: normal bowel sounds, soft.  ABSENT: distended, 

guarding, mass, organolmegaly, rebound, tenderness


Rectal exam: PRESENT: deferred


Extremities exam: ABSENT: pedal edema


Musculoskeletal exam: PRESENT: normal inspection


Neurological exam: PRESENT: alert, awake, oriented to person, oriented to place

, oriented to time, oriented to situation, CN II-XII grossly intact.  ABSENT: 

motor sensory deficit


Psychiatric exam: PRESENT: appropriate affect, normal mood.  ABSENT: homicidal 

ideation, suicidal ideation


Skin exam: PRESENT: dry, intact, warm.  ABSENT: cyanosis, rash





Results


Laboratory Results: 


 





 07/22/18 04:26 





 











  07/22/18





  04:26


 


Sodium  137.3


 


Potassium  4.4


 


Chloride  102


 


Carbon Dioxide  21 L


 


Anion Gap  14


 


BUN  22 H


 


Creatinine  0.70


 


Est GFR ( Amer)  > 60


 


Est GFR (Non-Af Amer)  > 60


 


Glucose  275 H


 


Calcium  9.3


 


Total Bilirubin  0.5


 


AST  17


 


ALT  32


 


Alkaline Phosphatase  89


 


Total Protein  6.3


 


Albumin  3.4 L














Assessment & Plan





- Diagnosis


(1) Diabetes mellitus type 2, uncontrolled


Qualifiers: 


   Diabetes mellitus long term insulin use: without long term use   Diabetes 

mellitus complication status: with hyperglycemia   Qualified Code(s): E11.65 - 

Type 2 diabetes mellitus with hyperglycemia   


Is this a current diagnosis for this admission?: Yes   





(2) Morbid obesity with BMI of 60.0-69.9, adult


Is this a current diagnosis for this admission?: Yes   





(3) HTN (hypertension)


Qualifiers: 


   Hypertension type: essential hypertension   Qualified Code(s): I10 - 

Essential (primary) hypertension   


Is this a current diagnosis for this admission?: Yes   





(4) HLD (hyperlipidemia)


Qualifiers: 


   Hyperlipidemia type: pure hypercholesterolemia   Qualified Code(s): E78.00 - 

Pure hypercholesterolemia, unspecified; E78.0 - Pure hypercholesterolemia   


Is this a current diagnosis for this admission?: Yes   





- Time


Time Spent with patient: 25-34 minutes


Medications reviewed and adjusted accordingly: Yes


Anticipated discharge: Home


Within: Other





- Inpatient Certification


Based on my medical assessment, after consideration of the patient's 

comorbidities, presenting symptoms, or acuity I expect that the services needed 

warrant INPATIENT care.: Yes


I certify that my determination is in accordance with my understanding of 

Medicare's requirements for reasonable and necessary INPATIENT services [42 CFR 

412.3e].: Yes


Medical Necessity: Need Close Monitoring Due to Risk of Patient Decompensation, 

Need For IV Fluids, Need For Continuous Telemetry Monitoring, Risk of 

Complication if Not Cared For in Hospital


Post Hospital Care: D/C Planner Documentation





- Plan Summary


Plan Summary: 





See covering attending physician orders.

## 2018-07-23 RX ADMIN — INSULIN LISPRO PRN UNIT: 100 INJECTION, SOLUTION INTRAVENOUS; SUBCUTANEOUS at 17:20

## 2018-07-23 RX ADMIN — SITAGLIPTIN SCH MG: 50 TABLET, FILM COATED ORAL at 15:56

## 2018-07-23 RX ADMIN — SITAGLIPTIN SCH MG: 50 TABLET, FILM COATED ORAL at 08:52

## 2018-07-23 RX ADMIN — INSULIN LISPRO PRN UNIT: 100 INJECTION, SOLUTION INTRAVENOUS; SUBCUTANEOUS at 21:45

## 2018-07-23 RX ADMIN — ATORVASTATIN CALCIUM SCH MG: 20 TABLET, FILM COATED ORAL at 21:43

## 2018-07-23 RX ADMIN — INSULIN LISPRO PRN UNIT: 100 INJECTION, SOLUTION INTRAVENOUS; SUBCUTANEOUS at 09:03

## 2018-07-23 RX ADMIN — VALSARTAN SCH MG: 160 TABLET ORAL at 13:21

## 2018-07-23 RX ADMIN — METFORMIN HYDROCHLORIDE SCH MG: 500 TABLET, FILM COATED ORAL at 08:52

## 2018-07-23 RX ADMIN — AMLODIPINE BESYLATE SCH MG: 10 TABLET ORAL at 13:22

## 2018-07-23 RX ADMIN — GLIPIZIDE SCH MG: 5 TABLET ORAL at 13:22

## 2018-07-23 RX ADMIN — METFORMIN HYDROCHLORIDE SCH MG: 500 TABLET, FILM COATED ORAL at 15:56

## 2018-07-23 RX ADMIN — GLIPIZIDE SCH MG: 5 TABLET ORAL at 19:51

## 2018-07-23 RX ADMIN — INSULIN LISPRO PRN UNIT: 100 INJECTION, SOLUTION INTRAVENOUS; SUBCUTANEOUS at 13:13

## 2018-07-23 RX ADMIN — HYDROCHLOROTHIAZIDE SCH MG: 25 TABLET ORAL at 13:19

## 2018-07-23 NOTE — PDOC DISCHARGE SUMMARY
General





- Admit/Disc Date/PCP


Admission Date/Primary Care Provider: 


  07/21/18 12:46





  TYRONE HAHN MD





Discharge Date: 07/23/18





- Discharge Diagnosis


(1) Diabetic hyperosmolar non-ketotic state


Is this a current diagnosis for this admission?: Yes   





(2) HLD (hyperlipidemia)


Is this a current diagnosis for this admission?: Yes   





- Additional Information


Resuscitation Status: Full Code


Prescriptions: 


Atorvastatin Calcium 40 mg PO DAILY #90 tablet


Empagliflozin [Jardiance] 25 mg PO DAILY #90 tablet


Metformin HCl 1,000 mg PO BID #180 tablet


Sitagliptin Phosphate [Januvia] 100 mg PO DAILY #90 tablet


Home Medications: 








Amlodipine/Valsartan/Hcthiazid [Amlod-Valsa-Hctz -25 mg] 1 tab PO DAILY 07 /21/18 


Clindamycin HCl [Clindamycin HCl] 300 mg PO Q8 MDD FILLED 7/13 FOR 10DS 07/21/ 18 


Glipizide [Glocotrol 5 Mg Tablet] 5 mg PO BID 07/21/18 


Nystatin 4 tsp PO QID 07/21/18 


Atorvastatin Calcium 40 mg PO DAILY #90 tablet 07/23/18 


Empagliflozin [Jardiance] 25 mg PO DAILY #90 tablet 07/23/18 


Metformin HCl 1,000 mg PO BID #180 tablet 07/23/18 


Sitagliptin Phosphate [Januvia] 100 mg PO DAILY #90 tablet 07/23/18 











History of Present Illness


History of Present Illness: 


CONNIE MURILLO is a 53 year old female,She was admitted when she presented with 

newly diagnosed severe hyperglycemia with hyperosmolar








Hospital Course


Hospital Course: 





She was admitted managed for hyperosmolar nonketotic diabetes mellitus, newly 

diagnosed, she was treated with IV fluid normal saline, insulin, she was seen 

by diabetic educator.She also was started on oral hypoglycemic agents





Physical Exam


Vital Signs: 


 











Temp Pulse Resp BP Pulse Ox


 


 97.6 F   98   18   108/65   99 


 


 07/23/18 16:00  07/23/18 16:00  07/23/18 16:00  07/23/18 16:00  07/23/18 16:00








 Intake & Output











 07/22/18 07/23/18 07/24/18





 06:59 06:59 06:59


 


Intake Total 2641 1460 680


 


Balance 2641 2710 680


 


Weight 169.8 kg 170.1 kg 











General appearance: PRESENT: no acute distress, well-developed, well-nourished


Head exam: PRESENT: atraumatic, normocephalic


Eye exam: PRESENT: conjunctiva pink, EOMI, PERRLA


Ear exam: PRESENT: normal external ear exam


Mouth exam: PRESENT: moist, tongue midline


Neck exam: PRESENT: full ROM


Respiratory exam: PRESENT: clear to auscultation barbara


Cardiovascular exam: PRESENT: RRR, +S1, +S2


Pulses: PRESENT: normal dorsalis pedis pul, +2 pedal pulses bilateral


Vascular exam: PRESENT: normal capillary refill


GI/Abdominal exam: PRESENT: normal bowel sounds, soft


Rectal exam: PRESENT: deferred


Neurological exam: PRESENT: alert, awake, oriented to person, oriented to place

, oriented to time, oriented to situation, CN II-XII grossly intact


Psychiatric exam: PRESENT: appropriate affect, normal mood


Skin exam: PRESENT: dry, intact, warm





Results


Laboratory Results: 


 





 07/22/18 04:26 











Qualifiers





- *


PATIENT BEING DISCHARGED WITH ANY OF THE FOLLOWING DIAGNOSIS: No

## 2018-07-24 VITALS — SYSTOLIC BLOOD PRESSURE: 109 MMHG | DIASTOLIC BLOOD PRESSURE: 62 MMHG

## 2018-07-24 RX ADMIN — METFORMIN HYDROCHLORIDE SCH MG: 500 TABLET, FILM COATED ORAL at 08:49

## 2018-07-24 RX ADMIN — HYDROCHLOROTHIAZIDE SCH MG: 25 TABLET ORAL at 10:07

## 2018-07-24 RX ADMIN — INSULIN LISPRO PRN UNIT: 100 INJECTION, SOLUTION INTRAVENOUS; SUBCUTANEOUS at 08:50

## 2018-07-24 RX ADMIN — GLIPIZIDE SCH MG: 5 TABLET ORAL at 10:06

## 2018-07-24 RX ADMIN — VALSARTAN SCH MG: 160 TABLET ORAL at 10:08

## 2018-07-24 RX ADMIN — SITAGLIPTIN SCH MG: 50 TABLET, FILM COATED ORAL at 08:50

## 2018-07-24 RX ADMIN — AMLODIPINE BESYLATE SCH MG: 10 TABLET ORAL at 10:07
